# Patient Record
Sex: MALE | URBAN - METROPOLITAN AREA
[De-identification: names, ages, dates, MRNs, and addresses within clinical notes are randomized per-mention and may not be internally consistent; named-entity substitution may affect disease eponyms.]

---

## 2020-12-23 ENCOUNTER — OFFICE VISIT (OUTPATIENT)
Dept: URBAN - METROPOLITAN AREA CLINIC 22 | Facility: CLINIC | Age: 65
End: 2020-12-23
Payer: MEDICARE

## 2020-12-23 DIAGNOSIS — H35.349 MACULAR HOLE: Primary | ICD-10-CM

## 2020-12-23 PROCEDURE — 92134 CPTRZ OPH DX IMG PST SGM RTA: CPT | Performed by: OPTOMETRIST

## 2020-12-23 PROCEDURE — 99204 OFFICE O/P NEW MOD 45 MIN: CPT | Performed by: OPTOMETRIST

## 2020-12-23 ASSESSMENT — VISUAL ACUITY: OD: 20/50

## 2020-12-23 ASSESSMENT — INTRAOCULAR PRESSURE
OS: 20
OD: 17

## 2021-02-03 ENCOUNTER — OFFICE VISIT (OUTPATIENT)
Dept: URBAN - METROPOLITAN AREA CLINIC 23 | Facility: CLINIC | Age: 66
End: 2021-02-03
Payer: MEDICARE

## 2021-02-03 PROCEDURE — 99204 OFFICE O/P NEW MOD 45 MIN: CPT | Performed by: OPHTHALMOLOGY

## 2021-02-03 PROCEDURE — 92134 CPTRZ OPH DX IMG PST SGM RTA: CPT | Performed by: OPHTHALMOLOGY

## 2021-02-03 RX ORDER — PREDNISOLONE ACETATE 10 MG/ML
1 % SUSPENSION/ DROPS OPHTHALMIC
Qty: 10 | Refills: 5 | Status: ACTIVE
Start: 2021-02-03

## 2021-02-03 RX ORDER — OFLOXACIN 3 MG/ML
0.3 % SOLUTION/ DROPS OPHTHALMIC
Qty: 5 | Refills: 3 | Status: ACTIVE
Start: 2021-02-03

## 2021-02-03 ASSESSMENT — INTRAOCULAR PRESSURE
OD: 15
OS: 16

## 2021-02-03 NOTE — IMPRESSION/PLAN
Impression: Macular cyst, hole, or pseudohole, right eye: H35.341 Right. Condition: new problem addtl w/u needed. Vision: vision affected. Plan: Discussed diagnosis in detail with patient. Discussed risks of progression. Surgical treatment is recommended to repair the retina PPVx RIGHT EYE for possible vision improvement. Surgical risks and benefits were discussed, explained and understood by patient. Unable to tell how much vision will be recovered. Discussed gas bubble and post-op care: no traveling, flying or high altitude for approximately 6 - 8 weeks (do not recommend going over 3 thousand feet). All questions answered. Patient elects to proceed with recommendation. RL1. Educational material provided to patient. OCT and Optos OD confirms Macular Hole. Advise patient that he will not be able to see after the patch is removed on 1 day post-op and will not be able to see for weeks. The vision will slowly improve as the gas dissolves and will continue to slowly improve long after the gas is gone.

## 2021-03-16 ENCOUNTER — SURGERY (OUTPATIENT)
Dept: URBAN - METROPOLITAN AREA SURGERY 11 | Facility: SURGERY | Age: 66
End: 2021-03-16
Payer: MEDICARE

## 2021-03-16 PROCEDURE — 67042 VIT FOR MACULAR HOLE: CPT | Performed by: OPHTHALMOLOGY

## 2021-03-17 ENCOUNTER — POST-OPERATIVE VISIT (OUTPATIENT)
Dept: URBAN - METROPOLITAN AREA CLINIC 23 | Facility: CLINIC | Age: 66
End: 2021-03-17

## 2021-03-17 PROCEDURE — 99024 POSTOP FOLLOW-UP VISIT: CPT | Performed by: OPTOMETRIST

## 2021-03-17 ASSESSMENT — INTRAOCULAR PRESSURE
OD: 16
OS: 13

## 2021-03-17 NOTE — IMPRESSION/PLAN
Impression: S/P PPVx 25ga; Epiretinal Membranectomy; Intravitreal Injection of gas; Endo laser OD - 1 Day. Encounter for surgical aftercare following surgery on a sense organ  Z48.810. Plan: Use drops as directed above and on handout. Return if sudden vision change or increasing pain.  --Continue Ofloxacin 0.3%--Continue Prednisolone acetate 1%

## 2021-03-25 ENCOUNTER — POST-OPERATIVE VISIT (OUTPATIENT)
Dept: URBAN - METROPOLITAN AREA CLINIC 23 | Facility: CLINIC | Age: 66
End: 2021-03-25

## 2021-03-25 DIAGNOSIS — Z48.810 ENCOUNTER FOR SURGICAL AFTERCARE FOLLOWING SURGERY ON A SENSE ORGAN: Primary | ICD-10-CM

## 2021-03-25 PROCEDURE — 99024 POSTOP FOLLOW-UP VISIT: CPT | Performed by: OPHTHALMOLOGY

## 2021-03-25 ASSESSMENT — INTRAOCULAR PRESSURE
OD: 21
OS: 18

## 2021-03-25 NOTE — IMPRESSION/PLAN
Impression: S/P PPVx, ERMx, ILM Peel, Brillant  Blue, Kenalog, C3F8 18%, Focal laser of RT OD - 9 Days. Encounter for surgical aftercare following surgery on a sense organ  Z48.810. Post operative instructions reviewed - Condition is improving - Plan: Due to Coronavirus COVID-19 pandemic and National Emergency, deferred Slit Lamp examination. Findings are based on OCT and Optos. OCT OD shows poor view due to gas bubble and Optos OD shows gas bubble, retina appears attached. No treatment is required at this time. Advised patient that gas bubble can typically take 6-8 weeks to dissolve and the vision should start to improve as the bubble dissolves. Advised patient he may sleep on his back again and lift light weight items like a grocery bag. Recommend a retina follow - up in 1 month to reassess the condition.  --Continue Prednisolone acetate 1% QID OD do not Taper until both bottles are empty then D/C--Discontinue Ofloxacin 0.3%

## 2021-05-04 ENCOUNTER — POST-OPERATIVE VISIT (OUTPATIENT)
Dept: URBAN - METROPOLITAN AREA CLINIC 23 | Facility: CLINIC | Age: 66
End: 2021-05-04
Payer: MEDICARE

## 2021-05-04 PROCEDURE — 99024 POSTOP FOLLOW-UP VISIT: CPT | Performed by: OPHTHALMOLOGY

## 2021-05-04 ASSESSMENT — INTRAOCULAR PRESSURE
OD: 20
OS: 18

## 2021-05-04 NOTE — IMPRESSION/PLAN
Impression: S/P PPVx, ERMx, ILM Peel, Brillant  Blue, Kenalog, C3F8 18%, Focal laser of RT OD - 49 Days. Encounter for surgical aftercare following surgery on a sense organ  Z48.810. Excellent post op course   Post operative instructions reviewed - Condition is improving - Plan: Due to Coronavirus COVID-19 pandemic and National Emergency, deferred Slit Lamp examination. Findings are based on OCT and Optos. OCT shows stable centrally, no hole and no edema OD and Optos shows gas bubble 15-20%. No treatment is require at this time. Recommend a retina follow - up in 2 mos.

## 2021-07-15 ENCOUNTER — OFFICE VISIT (OUTPATIENT)
Dept: URBAN - METROPOLITAN AREA CLINIC 23 | Facility: CLINIC | Age: 66
End: 2021-07-15
Payer: MEDICARE

## 2021-07-15 DIAGNOSIS — H35.341 MACULAR CYST, HOLE, OR PSEUDOHOLE, RIGHT EYE: Primary | ICD-10-CM

## 2021-07-15 PROCEDURE — 92250 FUNDUS PHOTOGRAPHY W/I&R: CPT | Performed by: OPHTHALMOLOGY

## 2021-07-15 PROCEDURE — 92134 CPTRZ OPH DX IMG PST SGM RTA: CPT | Performed by: OPHTHALMOLOGY

## 2021-07-15 PROCEDURE — 99213 OFFICE O/P EST LOW 20 MIN: CPT | Performed by: OPHTHALMOLOGY

## 2021-07-15 ASSESSMENT — INTRAOCULAR PRESSURE
OS: 16
OD: 15

## 2021-07-15 NOTE — IMPRESSION/PLAN
Impression: s/p PPVX for Repair of Macular cyst, hole, or pseudohole, right eye 03/16/2021: H35.341 Right. Condition: resolved with sx. Vision: improved and stable. Plan: Discussed diagnosis in detail with patient. Exam shows the macula is stable, OCT OD shows no Macular Hole and Optos OD shows no Macular Hole, laser tx sup. No treatment is required at this time. Will continue to observe condition and or symptoms. Patient can proceed with a refraction, he defers. Recommend a retina follow - up in 9 mos.